# Patient Record
Sex: MALE | Race: WHITE | NOT HISPANIC OR LATINO | URBAN - METROPOLITAN AREA
[De-identification: names, ages, dates, MRNs, and addresses within clinical notes are randomized per-mention and may not be internally consistent; named-entity substitution may affect disease eponyms.]

---

## 2018-01-12 ENCOUNTER — IMPORTED ENCOUNTER (OUTPATIENT)
Dept: URBAN - METROPOLITAN AREA CLINIC 31 | Facility: CLINIC | Age: 83
End: 2018-01-12

## 2018-01-12 PROBLEM — E11.3411: Noted: 2018-01-12

## 2018-01-12 PROBLEM — H40.1131: Noted: 2018-01-12

## 2018-01-12 PROBLEM — Z96.1: Noted: 2018-01-12

## 2018-01-12 PROBLEM — E11.3312: Noted: 2018-01-12

## 2018-01-12 PROBLEM — H47.211: Noted: 2018-01-12

## 2018-01-12 PROCEDURE — 92015 DETERMINE REFRACTIVE STATE: CPT

## 2018-01-12 PROCEDURE — 92004 COMPRE OPH EXAM NEW PT 1/>: CPT

## 2018-01-12 PROCEDURE — 92250 FUNDUS PHOTOGRAPHY W/I&R: CPT

## 2018-01-12 PROCEDURE — 99080 SPECIAL REPORTS OR FORMS: CPT

## 2018-01-12 NOTE — PATIENT DISCUSSION
1.  Glasses change optional. Explained new rx will be of limited improvement due to retina OU.2. Refractive error3. Primary open angle glaucoma OU - Continue with current treatment plan. Discussed importance of compliance. Will continue to monitor. 4.  Optic Atrophy OD  -- The patient has optic atrophy of the right eye which is the likely cause of his/her decreased vision. The condition was explained to the patient. 5.  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 Markell Gamino as scheduled. 6. 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 Markell Gamino. Pseudophakia OU - IOLs stable. Monitor. 8. Return for an appointment in 1 year for comprehensive exam. Optos with Dr. Sudhakar Benavides.

## 2019-01-30 ENCOUNTER — IMPORTED ENCOUNTER (OUTPATIENT)
Dept: URBAN - METROPOLITAN AREA CLINIC 31 | Facility: CLINIC | Age: 84
End: 2019-01-30

## 2019-01-30 PROBLEM — H47.211: Noted: 2019-01-30

## 2019-01-30 PROBLEM — E11.3513: Noted: 2019-01-30

## 2019-01-30 PROBLEM — H40.1121: Noted: 2019-01-30

## 2019-01-30 PROBLEM — H40.1112: Noted: 2019-01-30

## 2019-01-30 PROBLEM — Z96.1: Noted: 2019-01-30

## 2019-01-30 PROCEDURE — 92014 COMPRE OPH EXAM EST PT 1/>: CPT

## 2019-01-30 PROCEDURE — 92250 FUNDUS PHOTOGRAPHY W/I&R: CPT

## 2019-01-30 NOTE — PATIENT DISCUSSION
1. Proliferative Diabetic Retinopathy OU -  Follow up with Dr. Indu Lee as scheduled. 2. Pseudophakia OU - IOLs stable. Monitor. 3. Primary open angle glaucoma OD moderate- Stable with good IOP. Continue with current treatment plan. Discussed importance of compliance. Will continue to monitor. 4.  Primary open angle glaucoma OS mild - Stable. Good IOP. Continue with current treatment plan. Discussed importance of compliance. Will continue to monitor. 5.  Optic Atrophy OD  -- Monitor6. Return for an appointment in 1 year for comprehensive exam and Optos with Dr. Forest Perez. 7.  No rx for distance. Uses a magnifier for reading.

## 2019-01-30 NOTE — PATIENT DISCUSSION
Primary open angle glaucoma OD moderate- Continue with current treatment plan. Discussed importance of compliance. Will continue to monitor.

## 2022-04-02 ASSESSMENT — VISUAL ACUITY
OS_SC: 20/70
OU_SC: 20/70
OS_CC: 20/50
OD_SC: 20/800

## 2022-04-02 ASSESSMENT — TONOMETRY
OS_IOP_MMHG: 11
OS_IOP_MMHG: 10
OD_IOP_MMHG: 12
OD_IOP_MMHG: 14